# Patient Record
Sex: FEMALE | Race: WHITE
[De-identification: names, ages, dates, MRNs, and addresses within clinical notes are randomized per-mention and may not be internally consistent; named-entity substitution may affect disease eponyms.]

---

## 2018-10-08 ENCOUNTER — HOSPITAL ENCOUNTER (EMERGENCY)
Dept: HOSPITAL 38 - CC.ED | Age: 53
LOS: 1 days | Discharge: HOME | End: 2018-10-09
Payer: COMMERCIAL

## 2018-10-08 DIAGNOSIS — Z88.1: ICD-10-CM

## 2018-10-08 DIAGNOSIS — M54.9: Primary | ICD-10-CM

## 2018-10-08 DIAGNOSIS — Z79.899: ICD-10-CM

## 2018-10-08 DIAGNOSIS — E03.9: ICD-10-CM

## 2018-10-08 DIAGNOSIS — Z87.891: ICD-10-CM

## 2018-10-08 NOTE — EDM.PDOC
ED HPI GENERAL MEDICAL PROBLEM





- General


Chief Complaint: Back Pain or Injury


Stated Complaint: BACK PAIN


Time Seen by Provider: 10/08/18 23:46


Source of Information: Reports: Patient


History Limitations: Reports: No Limitations





- History of Present Illness


INITIAL COMMENTS - FREE TEXT/NARRATIVE: 





Patient presents with complaints of a burning sensation throughout her whole 

back that started about one hour prior to coming to the ER.  Patient states had 

an episode similar to it at 0500 this am.  She states both times it came on 

suddenly and the burning was throughout her whole back.  She has not had any 

injury.  Pain does not change with positions.  Does not do any heavy lifting.  

She denies any chest pain, shortness of breath, nausea or vomiting.  No 

abdominal pain.  Has had heartburn before and this pain was different.  No 

urinary complaints.  No bowel changes or blood in her stools.  Upon my 

presentation the pain had dropped from a 9 to a 2/10.  Had taken an Aleve at 

home.


Onset: Today, Sudden


Duration: Hour(s):, Improving


Location: Reports: Back


Quality: Reports: Burning


Severity: Severe


Improves with: Reports: None


Worsens with: Reports: None


Associated Symptoms: Denies: Confusion, Chest Pain, Cough, Fever/Chills, Loss 

of Appetite, Nausea/Vomiting, Shortness of Breath, Syncope, Weakness


Treatments PTA: Reports: NSAIDS


  ** Middle Back


Pain Score (Numeric/FACES): 9





- Related Data


 Allergies











Allergy/AdvReac Type Severity Reaction Status Date / Time


 


ceftriaxone sodium Allergy  Anaphylactic Verified 10/08/18 23:35





[From Rocephin]   Shock  











Home Meds: 


 Home Meds





Levothyroxine Sodium [Synthroid] 125 mcg PO DAILY 10/08/18 [History]


Sertraline HCl 50 mg PO DAILY 10/08/18 [History]


buPROPion HCl [Wellbutrin Xl] 300 mg PO DAILY 10/08/18 [History]











Past Medical History


OB/GYN History: Reports: Pregnancy


Psychiatric History: Reports: Depression


Endocrine/Metabolic History: Reports: Hypothyroidism





- Past Surgical History


HEENT Surgical History: Reports: Other (See Below)


Other HEENT Surgeries/Procedures: "RK surgery"


Endocrine Surgical History: Reports: None


Dermatological Surgical History: Reports: None





Social & Family History





- Family History


Family Medical History: Noncontributory





- Tobacco Use


Smoking Status *Q: Former Smoker


Used Tobacco, but Quit: Yes


Month/Year Tobacco Last Used: 20yrs ago





- Caffeine Use


Caffeine Use: Reports: Coffee





- Recreational Drug Use


Recreational Drug Use: No





ED ROS GENERAL





- Review of Systems


Review Of Systems: See Below


Constitutional: Reports: Chills.  Denies: Fever, Malaise, Weakness, Decreased 

Appetite


HEENT: Reports: No Symptoms


Respiratory: Denies: Shortness of Breath, Cough


Cardiovascular: Denies: Chest Pain, Edema, Lightheadedness


Endocrine: Denies: Fatigue


GI/Abdominal: Denies: Abdominal Pain, Black Stool, Bloody Stool, Nausea, 

Vomiting


: Reports: No Symptoms


Musculoskeletal: Reports: Back Pain


Skin: Reports: No Symptoms


Neurological: Reports: No Symptoms


Psychiatric: Reports: No Symptoms





ED EXAM, UPPER BACK/NECK PAIN





- Physical Exam


Exam: See Below


Exam Limited By: No Limitations


General Appearance: Alert, WD/WN, No Apparent Distress


Ears Exam: Normal External Exam, Normal TMs


Nose Exam: Normal Inspection, Normal Mucousa, No Blood


Throat/Mouth Exam: Normal Inspection, Normal Oropharynx


Head Exam: Normocephalic


Cardiovascular/Respiratory: Regular Rate, Rhythm


GI/Abdominal: Normal Bowel Sounds, Soft, Non-Tender


Extremities: Normal Inspection, Normal Capillary Refill


Neurologic: Normal Mood/Affect, Oriented x 3





Course





- Vital Signs


Last Recorded V/S: 


 Last Vital Signs











Temp  96.7 F   10/08/18 23:26


 


Pulse  76   10/08/18 23:26


 


Resp  16   10/08/18 23:26


 


BP  134/86   10/08/18 23:26


 


Pulse Ox  100   10/08/18 23:26














- Orders/Labs/Meds


Orders: 


 Active Orders 24 hr











 Category Date Time Status


 


 EKG Documentation Completion [RC] ROUTINE Care  10/08/18 23:54 Active











Labs: 


 Laboratory Tests











  10/08/18 10/08/18 10/08/18 Range/Units





  00:10 00:15 00:15 


 


WBC    9.0  (5.0-10.0)  10^3/uL


 


RBC    4.63  (4.00-5.50)  10^6/uL


 


Hgb    14.4  (12.0-16.0)  g/dL


 


Hct    43.3  (37.0-47.0)  %


 


MCV    93.5  (82.0-94.0)  fL


 


MCH    31.1  (27.0-32.0)  pg


 


MCHC    33.3  (33.0-38.0)  g/dL


 


RDW Coeff of Thao    13.0  (11.0-15.0)  %


 


Plt Count    284  (150-400)  10^3/uL


 


Neut % (Auto)    57.6  (35-85)  %


 


Lymph % (Auto)    30.5  (10-55)  %


 


Mono % (Auto)    8.8  (0-16)  %


 


Eos % (Auto)    2.8  (0-5)  %


 


Baso % (Auto)    0.3  (0-3)  %


 


Neut # (Auto)    5.16  (1.80-7.00)  10^3/uL


 


Lymph # (Auto)    2.74  (1.00-4.80)  10^3/uL


 


Mono # (Auto)    0.79  (0.00-0.80)  10^3/uL


 


Eos # (Auto)    0.25  (0.00-0.45)  10^3/uL


 


Baso # (Auto)    0.03  10^3/uL


 


D-Dimer, Quantitative     (0.00-0.50)  


 


Sodium   140   (136-145)  mEq/L


 


Potassium   4.0   (3.5-5.0)  mEq/L


 


Chloride   104   ()  mEq/L


 


Carbon Dioxide   30   (21-32)  mmol/L


 


BUN   15   (7-18)  mg/dL


 


Creatinine   0.9   (0.6-1.0)  mg/dL


 


Est Cr Clr Drug Dosing   57.15   mL/min


 


Estimated GFR (MDRD)   > 60   (>=60)  mL/min


 


Glucose   75   (75-99)  mg/dL


 


Calcium   9.1   (8.4-10.1)  mg/dL


 


Total Bilirubin   0.4   (0.0-1.0)  mg/dL


 


AST   58 H   (15-37)  U/L


 


ALT   67   (12-78)  U/L


 


Alkaline Phosphatase   100   ()  U/L


 


Lactate Dehydrogenase   282 H   (100-190)  U/L


 


Creatine Kinase   80   ()  U/L


 


Troponin I   < 0.017   (0.00-0.06)  ng/mL


 


C-Reactive Protein   0.5   (0.2-0.8)  mg/dL


 


Total Protein   7.6   (6.4-8.2)  g/dL


 


Albumin   3.7   (3.4-5.0)  g/dL


 


Amylase   63   ()  U/L


 


Urine Color  Yellow    (YELLOW)  


 


Urine Appearance  Clear    (CLEAR)  


 


Urine pH  6.0    (4.5-8.0)  


 


Ur Specific Gravity  <= 1.005    (1.003-1.020)  


 


Urine Protein  Negative    (NEGATIVE)  mg/dL


 


Urine Glucose (UA)  Negative    (NEGATIVE)  mg/dL


 


Urine Ketones  Negative    (NEGATIVE)  mg/dL


 


Urine Occult Blood  Negative    (NEGATIVE)  


 


Urine Nitrite  Negative    (NEGATIVE)  


 


Urine Bilirubin  Negative    (NEGATIVE)  


 


Urine Urobilinogen  0.2    (0.2-1.0)  EU/dL


 


Ur Leukocyte Esterase  Trace H    (NEGATIVE)  


 


Urine RBC  Not seen    (0-5)  /HPF


 


Urine WBC  0-5    (0-5)  /HPF


 


Ur Epithelial Cells  Few H    (NOT SEEN)  /HPF


 


Urine Bacteria  Few H    (NOT SEEN)  /HPF














  10/08/18 Range/Units





  00:15 


 


WBC   (5.0-10.0)  10^3/uL


 


RBC   (4.00-5.50)  10^6/uL


 


Hgb   (12.0-16.0)  g/dL


 


Hct   (37.0-47.0)  %


 


MCV   (82.0-94.0)  fL


 


MCH   (27.0-32.0)  pg


 


MCHC   (33.0-38.0)  g/dL


 


RDW Coeff of Thao   (11.0-15.0)  %


 


Plt Count   (150-400)  10^3/uL


 


Neut % (Auto)   (35-85)  %


 


Lymph % (Auto)   (10-55)  %


 


Mono % (Auto)   (0-16)  %


 


Eos % (Auto)   (0-5)  %


 


Baso % (Auto)   (0-3)  %


 


Neut # (Auto)   (1.80-7.00)  10^3/uL


 


Lymph # (Auto)   (1.00-4.80)  10^3/uL


 


Mono # (Auto)   (0.00-0.80)  10^3/uL


 


Eos # (Auto)   (0.00-0.45)  10^3/uL


 


Baso # (Auto)   10^3/uL


 


D-Dimer, Quantitative  0.71 H  (0.00-0.50)  


 


Sodium   (136-145)  mEq/L


 


Potassium   (3.5-5.0)  mEq/L


 


Chloride   ()  mEq/L


 


Carbon Dioxide   (21-32)  mmol/L


 


BUN   (7-18)  mg/dL


 


Creatinine   (0.6-1.0)  mg/dL


 


Est Cr Clr Drug Dosing   mL/min


 


Estimated GFR (MDRD)   (>=60)  mL/min


 


Glucose   (75-99)  mg/dL


 


Calcium   (8.4-10.1)  mg/dL


 


Total Bilirubin   (0.0-1.0)  mg/dL


 


AST   (15-37)  U/L


 


ALT   (12-78)  U/L


 


Alkaline Phosphatase   ()  U/L


 


Lactate Dehydrogenase   (100-190)  U/L


 


Creatine Kinase   ()  U/L


 


Troponin I   (0.00-0.06)  ng/mL


 


C-Reactive Protein   (0.2-0.8)  mg/dL


 


Total Protein   (6.4-8.2)  g/dL


 


Albumin   (3.4-5.0)  g/dL


 


Amylase   ()  U/L


 


Urine Color   (YELLOW)  


 


Urine Appearance   (CLEAR)  


 


Urine pH   (4.5-8.0)  


 


Ur Specific Gravity   (1.003-1.020)  


 


Urine Protein   (NEGATIVE)  mg/dL


 


Urine Glucose (UA)   (NEGATIVE)  mg/dL


 


Urine Ketones   (NEGATIVE)  mg/dL


 


Urine Occult Blood   (NEGATIVE)  


 


Urine Nitrite   (NEGATIVE)  


 


Urine Bilirubin   (NEGATIVE)  


 


Urine Urobilinogen   (0.2-1.0)  EU/dL


 


Ur Leukocyte Esterase   (NEGATIVE)  


 


Urine RBC   (0-5)  /HPF


 


Urine WBC   (0-5)  /HPF


 


Ur Epithelial Cells   (NOT SEEN)  /HPF


 


Urine Bacteria   (NOT SEEN)  /HPF














- Re-Assessments/Exams


Free Text/Narrative Re-Assessment/Exam: 





10/09/18 00:47


Labs essentially all negative, liver enzymes mildly elevated.  Pain is just a 

dull ache at 1 now without any medications for it.  Discussed further work up 

that may include a gallbladder ultrasound.  Will discuss with her provider Dr. Cornejo in [place] tomorrow.





Departure





- Departure


Time of Disposition: 00:47


Disposition: Home, Self-Care 01


Condition: Good


Clinical Impression: 


 Back pain








- Discharge Information


Referrals: 


PCP,None [Primary Care Provider] - 


Forms:  ED Department Discharge


Additional Instructions: 


1.  Push fluids


2.  Consider gallbladder ultrasound


3.  If develop any fevers, abdominal pain, blood in stools, need to be 

reevaluated.  Call Dr. Cornejo with results from [facility] to discuss further plan.





- My Orders


Last 24 Hours: 


My Active Orders





10/08/18 23:54


EKG Documentation Completion [RC] ROUTINE 














- Assessment/Plan


Last 24 Hours: 


My Active Orders





10/08/18 23:54


EKG Documentation Completion [RC] ROUTINE

## 2018-10-09 LAB
CHLORIDE SERPL-SCNC: 104 MEQ/L (ref 98–106)
SODIUM SERPL-SCNC: 140 MEQ/L (ref 136–145)

## 2019-11-08 NOTE — OR
DATE OF OPERATION:  11/08/2019

 

PREOPERATIVE DIAGNOSIS:  HEME-POSITIVE STOOL.

 

POSTOPERATIVE DIAGNOSIS:  HEME-POSITIVE STOOL.

 

SURGEON:  Jonathan Amin MD

 

PROCEDURE:  DIAGNOSTIC COLONOSCOPY WITH FORCEPS POLYP REMOVAL X1.

 

ANESTHESIA:  MAC.

 

COMPLICATIONS:  None.

 

SPECIMEN:  Small hyperplastic appearing polyp, hepatic flexure.

 

FINDINGS:

1. Full-length colonoscopy.

2. Hyperplastic polyp, hepatic flexure.

3. Mild distal sigmoid diverticulosis.

4. Internal hemorrhoids.

 

RECOMMENDATIONS:  Routine colonoscopy in 5 years.  The patient could consider

EGD for ongoing heme-positive stools.

 

INDICATIONS:  The patient was apparently seen for routine physical and was found

have heme-positive stools.  I believe she had positive Hemoccult card as well at

home on her own, and she was sent for diagnostic colonoscopy.

 

DESCRIPTION OF PROCEDURE:  The patient was prepped and draped, placed in the

left lateral decubitus position.  A lubricated Olympus colonoscope was inserted

and with relative ease advanced to the cecum.  The patient was very tortuous,

but scope passed with relative ease.  We were able to get deep into the cecal

pouch, irrigate, and visualize the appendiceal orifice.  I was also able to

intubate into the terminal ileum through the ileocecal valve.  Upon withdrawal,

terminal ileum, cecum, and ascending colon were unremarkable.  The patient had a

small elongated hyperplastic polyp, which was flat, right at the hepatic

flexure.  I removed it with 3 separate forceps biopsies without complication.

The rest of the transverse and descending colon was completely unremarkable.

The patient's sigmoid and left colon had no other polyps, mass, ulceration,

bleeding sites, vascular abnormalities, or signs of colitis.  There were

diverticula in the distal sigmoid and rectosigmoid junction, mild in severity.

No inflammatory changes seen.  I could find no other lesions.  The rectal vault

was benign.  Irrigation was accomplished.  Retroflexion showed some internal

hemorrhoids without any active bleeding.  Air was then suctioned, scope was

removed without complication.

 

APRIL/ALFREDA

DD:  11/08/2019 13:59:02

DT:  11/08/2019 15:08:26

Job #:  [id number]

CC:   Angelica Cornejo MD

      [place]  [address]

## 2020-07-08 NOTE — EDM.PDOC
ED HPI GENERAL MEDICAL PROBLEM





- General


Chief Complaint: General


Stated Complaint: back pain


Time Seen by Provider: 07/08/20 05:37


Source of Information: Reports: Patient


History Limitations: Reports: No Limitations





- History of Present Illness


INITIAL COMMENTS - FREE TEXT/NARRATIVE: 


Bianca is a 55 yo female who presents to the ED with concerns of upper back pain 

that radiated around her left side to her chest. States it woke her up around 

0400hrs this morning. States she was concerned of her heart and took two baby 

aspirin. Admits the pain lasted about 40 minutes and then subsided. Currently 

denies any discomfort. States the pain came on so intense, would rate it a 10 

out of 10. Has a difficult time explaining what the pain felt like. States 

nothing she did from positional changes would improve it or make it worse. 

Denies any cardiac history. States it did make her feel short of breath. No hx 

of duodenal or peptic ulcer disease. States she did have a cocktail last night 

but that isn't something she does on a regular basis. 


Onset: Today, Sudden


Onset Date: 07/08/20


Onset Time: 04:00


Duration: Resolved Prior to Arrival


Location: Reports: Chest, Abdomen, Back


Severity: Severe


Improves with: Reports: None


Worsens with: Reports: None


Associated Symptoms: Reports: No Other Symptoms


Treatments PTA: Reports: Aspirin





- Related Data


                                    Allergies











Allergy/AdvReac Type Severity Reaction Status Date / Time


 


ceftriaxone sodium Allergy  Anaphylactic Verified 07/08/20 05:07





[From Rocephin]   Shock  











Home Meds: 


                                    Home Meds





Sertraline HCl 50 mg PO DAILY 10/08/18 [History]


buPROPion HCL [Wellbutrin Xl] 300 mg PO DAILY 10/08/18 [History]


Ketoconazole 1 inch TOP DAILY PRN 11/06/19 [History]


Levothyroxine Sodium [Synthroid] 112 mcg PO DAILY 11/06/19 [History]


valACYclovir HCl [Valtrex] 500 mg PO DAILY PRN 11/06/19 [History]











Past Medical History


HEENT History: Reports: None


Cardiovascular History: Reports: None


Respiratory History: Reports: None


Gastrointestinal History: Reports: None


Genitourinary History: Reports: Renal Calculus


OB/GYN History: Reports: Pregnancy


Musculoskeletal History: Reports: None


Neurological History: Reports: None


Psychiatric History: Reports: Depression


Endocrine/Metabolic History: Reports: Hypothyroidism





- Past Surgical History


HEENT Surgical History: Reports: Other (See Below)


Other HEENT Surgeries/Procedures: "RK surgery"


Endocrine Surgical History: Reports: None


Dermatological Surgical History: Reports: None





Social & Family History





- Family History


Family Medical History: Noncontributory





- Tobacco Use


Smoking Status *Q: Never Smoker





- Caffeine Use


Caffeine Use: Reports: Coffee





ED ROS GENERAL





- Review of Systems


Review Of Systems: See Below


Constitutional: Denies: Fever, Chills, Fatigue


HEENT: Reports: No Symptoms


Respiratory: Denies: Shortness of Breath, Wheezing, Cough


Cardiovascular: Reports: Chest Pain.  Denies: Blood Pressure Problem, Dyspnea on

 Exertion


GI/Abdominal: Reports: Abdominal Pain.  Denies: Nausea, Vomiting


: Reports: No Symptoms


Musculoskeletal: Reports: Back Pain


Skin: Reports: No Symptoms


Neurological: Reports: No Symptoms


Psychiatric: Reports: Depression





ED EXAM, GENERAL





- Physical Exam


Exam: See Below


Exam Limited By: No Limitations


General Appearance: Alert, WD/WN, No Apparent Distress


Ears: Normal External Exam, Normal Canal, Hearing Grossly Normal


Nose: Normal Inspection, No Blood


Throat/Mouth: Normal Inspection, Normal Lips, Normal Oropharynx, Normal Voice, 

No Airway Compromise


Head: Atraumatic, Normocephalic


Neck: Normal Inspection, Supple


Respiratory/Chest: No Respiratory Distress, Lungs Clear, Normal Breath Sounds, 

No Accessory Muscle Use, Chest Non-Tender


Cardiovascular: Regular Rate, Rhythm, No Edema, No Murmur


GI/Abdominal: Normal Bowel Sounds, Soft, Non-Tender, No Organomegaly, No 

Distention, No Abnormal Bruit


Back Exam: Normal Inspection


Extremities: Normal Inspection, No Pedal Edema


Neurological: Alert, Oriented, Normal Cognition, No Motor/Sensory Deficits


Psychiatric: Normal Affect, Normal Mood


Skin Exam: Warm, Dry, Intact, Normal Color, No Rash





EKG INTERPRETATION


EKG Date: 07/08/20


Time: 05:15


Rhythm: NSR


Axis: Normal


P-Wave: Present


QRS: Normal


ST-T: Normal


QT: Normal


Comparison: NA - No Prior EKG





Course





- Vital Signs


Last Recorded V/S: 


                                Last Vital Signs











Temp  97.8 F   07/08/20 05:03


 


Pulse  68   07/08/20 05:03


 


Resp  16   07/08/20 05:03


 


BP  151/79 H  07/08/20 05:03


 


Pulse Ox  100   07/08/20 05:03














- Orders/Labs/Meds


Orders: 


                               Active Orders 24 hr











 Category Date Time Status


 


 EKG Documentation Completion [RC] STAT Care  07/08/20 05:17 Active


 


 CBC WITH AUTO DIFF [HEME] Stat Lab  07/08/20 05:17 Ordered


 


 COMPREHENSIVE METABOLIC PN,CMP [CHEM] Stat Lab  07/08/20 05:17 Ordered


 


 CREATINE KINASE,CK [CHEM] Stat Lab  07/08/20 05:17 Ordered


 


 INR,PT,PROTHROMBIN TIME [COAG] Stat Lab  07/08/20 05:17 Ordered


 


 TROPONIN I [CHEM] Stat Lab  07/08/20 05:17 Ordered














Departure





- Departure


Time of Disposition: 06:14


Disposition: Home, Self-Care 01


Condition: Good


Clinical Impression: 


 Atypical chest pain, Mid-back pain, acute








- Discharge Information


Instructions:  Nonspecific Chest Pain, Adult, Easy-to-Read, Pain Without a Known

 Cause


Forms:  ED Department Discharge


Additional Instructions: 


1) Unknown cause of prior pain identified





2) Strongly encourage if pain returns to return immediately to ED.





3) No change in medications





4) Handout given on nonspecific chest pain





5) Discussed possible etiologies and symptoms to watch for. 





6) Again, if pain would return or any concerns at all, please return to ED.  





Sepsis Event Note (ED)





- Evaluation


Sepsis Screening Result: No Definite Risk





- Focused Exam


Vital Signs: 


                                   Vital Signs











  Temp Pulse Resp BP Pulse Ox


 


 07/08/20 05:03  97.8 F  68  16  151/79 H  100














- Problem List & Annotations


(1) Atypical chest pain


SNOMED Code(s): 494488044


   Code(s): R07.89 - OTHER CHEST PAIN   Status: Acute   





(2) Mid-back pain, acute


SNOMED Code(s): 374482706


   Code(s): M54.9 - DORSALGIA, UNSPECIFIED   Status: Acute   





- My Orders


Last 24 Hours: 


My Active Orders





07/08/20 05:17


EKG Documentation Completion [RC] STAT 


CBC WITH AUTO DIFF [HEME] Stat 


COMPREHENSIVE METABOLIC PN,CMP [CHEM] Stat 


CREATINE KINASE,CK [CHEM] Stat 


INR,PT,PROTHROMBIN TIME [COAG] Stat 


TROPONIN I [CHEM] Stat 














- Assessment/Plan


Last 24 Hours: 


My Active Orders





07/08/20 05:17


EKG Documentation Completion [RC] STAT 


CBC WITH AUTO DIFF [HEME] Stat 


COMPREHENSIVE METABOLIC PN,CMP [CHEM] Stat 


CREATINE KINASE,CK [CHEM] Stat 


INR,PT,PROTHROMBIN TIME [COAG] Stat 


TROPONIN I [CHEM] Stat 











Plan: 


Bianca has been completely asymptomatic in ED. EKG completed which showed normal 

sinus rhythm with no concerning findings. Laboratory work completed and negative

 as well, grossly unremarkable. Discussed findings with Bianca and unsure of 

exactly what caused her brief discomfort. Recommend if pain returns to return to

 ED.